# Patient Record
Sex: FEMALE | Race: OTHER | Employment: UNEMPLOYED | URBAN - METROPOLITAN AREA
[De-identification: names, ages, dates, MRNs, and addresses within clinical notes are randomized per-mention and may not be internally consistent; named-entity substitution may affect disease eponyms.]

---
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
## 2021-02-05 ENCOUNTER — DOCUMENTATION ONLY (OUTPATIENT)
Dept: PHARMACY | Age: 21
End: 2021-02-05

## 2021-02-05 NOTE — PROGRESS NOTES
[facility] at [address] [facility] Update    Date: 02/05/21    Joel Saravia 2000     Medication: Rinvoq 15mg tablet    Prior Authorization: 2/3/2021 - 2/3/2022    Co-pay $0    Fill: 2/4/2021    Ship: 2/4/2021    Deliver: 2/5/2021    Next Fill Due: 2/28/2021    Pharmacist offered counseling to the patient. Handout provided.     Parminder Boo, [address] at [place] [place]   [place], [address]  phone: [phone]   fax: [phone]

## 2022-04-29 ENCOUNTER — DOCUMENTATION ONLY (OUTPATIENT)
Dept: PHARMACY | Age: 22
End: 2022-04-29

## 2022-04-29 NOTE — PROGRESS NOTES
[facility] at [address] [facility] Update    Date: 04/29/22    Sonya Ventura 2000     Medication: Cimzia Starter Kit 200 mg/ml    Prior Authorization: through 4/14/2023    Co-pay $0    Fill: 4/14/22    Ship: 4/18/22    Deliver: 4/19/22    Next Fill Due: 5/10/22    Pharmacist offered counseling to the patient. Handout provided.     Mohamud Hummel, [address] at [place] [address] [place], [facility] [address]  phone: [phone]   fax: [phone]

## 2023-05-22 RX ORDER — PREDNISONE 5 MG/1
5 TABLET ORAL DAILY
COMMUNITY
Start: 2019-10-31

## 2023-05-22 RX ORDER — CERTOLIZUMAB PEGOL 200 MG/ML
INJECTION, SOLUTION SUBCUTANEOUS
COMMUNITY
Start: 2023-03-17

## 2023-08-03 RX ORDER — CERTOLIZUMAB PEGOL 200 MG/ML
INJECTION, SOLUTION SUBCUTANEOUS
Refills: 4 | OUTPATIENT
Start: 2023-08-03

## 2024-10-16 ENCOUNTER — CLINICAL DOCUMENTATION (OUTPATIENT)
Facility: HOSPITAL | Age: 24
End: 2024-10-16

## 2024-10-16 NOTE — PROGRESS NOTES
[facility] at [facility]  Specialty Pharmacy Update    Date: 10/16/24    Madina Cuevas 2000     Medication: Rinvoq 15 mg    Prior Authorization: through 10/16/2025    Copay: $0    Pharmacy will contact patient.    Corinne McEwen, [facility] at [address], [address]   [place] [address]  phone: [phone]   fax: [phone]